# Patient Record
Sex: FEMALE | URBAN - METROPOLITAN AREA
[De-identification: names, ages, dates, MRNs, and addresses within clinical notes are randomized per-mention and may not be internally consistent; named-entity substitution may affect disease eponyms.]

---

## 2023-11-10 ENCOUNTER — LAB OUTSIDE AN ENCOUNTER (OUTPATIENT)
Dept: URBAN - METROPOLITAN AREA CLINIC 27 | Facility: CLINIC | Age: 23
End: 2023-11-10

## 2023-11-10 ENCOUNTER — DASHBOARD ENCOUNTERS (OUTPATIENT)
Age: 23
End: 2023-11-10

## 2023-11-10 ENCOUNTER — OFFICE VISIT (OUTPATIENT)
Dept: URBAN - METROPOLITAN AREA CLINIC 27 | Facility: CLINIC | Age: 23
End: 2023-11-10
Payer: COMMERCIAL

## 2023-11-10 VITALS
DIASTOLIC BLOOD PRESSURE: 79 MMHG | RESPIRATION RATE: 17 BRPM | HEART RATE: 57 BPM | HEIGHT: 69 IN | WEIGHT: 140 LBS | SYSTOLIC BLOOD PRESSURE: 121 MMHG | BODY MASS INDEX: 20.73 KG/M2

## 2023-11-10 DIAGNOSIS — R19.4 ALTERATION IN BOWEL ELIMINATION: ICD-10-CM

## 2023-11-10 DIAGNOSIS — L98.8 FISTULA: ICD-10-CM

## 2023-11-10 PROCEDURE — 99244 OFF/OP CNSLTJ NEW/EST MOD 40: CPT | Performed by: INTERNAL MEDICINE

## 2023-11-10 PROCEDURE — 99204 OFFICE O/P NEW MOD 45 MIN: CPT | Performed by: INTERNAL MEDICINE

## 2023-11-10 NOTE — HPI-TODAY'S VISIT:
This is a 23-year-old female seen as a new patient in consultation at the request of  for her alternating diarrhea and constipation and recent finding of a fistula.  She was evaluated Dr. Andrade and the fistula is healed.  She is referred for possible colonoscopy to rule out Crohn's disease.  She has had change in her GI symptoms only in the past 3 years since she graduated college.  She does feel she has some lactose intolerance with pizza but also tries to avoid gluten but does eat gluten.  She has tried a FODMAP diet and that seems to help some as well.  She has alternating diarrhea and constipation but predominantly diarrhea.  Her weight is stable.

## 2023-11-13 LAB
A/G RATIO: 1.8
ABSOLUTE BASOPHILS: 30
ABSOLUTE EOSINOPHILS: 70
ABSOLUTE LYMPHOCYTES: 2180
ABSOLUTE MONOCYTES: 490
ABSOLUTE NEUTROPHILS: 2230
ALBUMIN: 4.5
ALKALINE PHOSPHATASE: 51
ALT (SGPT): 22
AST (SGOT): 29
BASOPHILS: 0.6
BILIRUBIN, TOTAL: 0.5
BUN/CREATININE RATIO: (no result)
BUN: 14
C-REACTIVE PROTEIN, QUANT: 0.9
CALCIUM: 9.7
CARBON DIOXIDE, TOTAL: 25
CHLORIDE: 104
CREATININE: 0.69
EGFR: 124
EOSINOPHILS: 1.4
GLIADIN (DEAMIDATED): 2.1
GLIADIN (DEAMIDATED): 9.8
GLOBULIN, TOTAL: 2.5
GLUCOSE: 82
HEMATOCRIT: 41.3
HEMOGLOBIN: 14
IMMUNOGLOBULIN A, QN, SERUM: 134
LYMPHOCYTES: 43.6
MCH: 29.9
MCHC: 33.9
MCV: 88.1
MONOCYTES: 9.8
MPV: 11.5
NEUTROPHILS: 44.6
PLATELET COUNT: 215
POTASSIUM: 4.4
PROTEIN, TOTAL: 7
RDW: 12
RED BLOOD CELL COUNT: 4.69
SODIUM: 139
T-TRANSGLUTAMINASE (TTG) IGA: <1
TSH: 2.04
WHITE BLOOD CELL COUNT: 5

## 2024-01-02 ENCOUNTER — WEB ENCOUNTER (OUTPATIENT)
Dept: URBAN - METROPOLITAN AREA SURGERY CENTER 7 | Facility: SURGERY CENTER | Age: 24
End: 2024-01-02

## 2024-01-04 ENCOUNTER — OFFICE VISIT (OUTPATIENT)
Dept: URBAN - METROPOLITAN AREA SURGERY CENTER 7 | Facility: SURGERY CENTER | Age: 24
End: 2024-01-04
Payer: COMMERCIAL

## 2024-01-04 ENCOUNTER — CLAIMS CREATED FROM THE CLAIM WINDOW (OUTPATIENT)
Dept: URBAN - METROPOLITAN AREA CLINIC 4 | Facility: CLINIC | Age: 24
End: 2024-01-04
Payer: COMMERCIAL

## 2024-01-04 DIAGNOSIS — K52.9 INFLAMMATION OF INTESTINES: ICD-10-CM

## 2024-01-04 DIAGNOSIS — R19.7 ACUTE DIARRHEA: ICD-10-CM

## 2024-01-04 DIAGNOSIS — K63.89 OTHER SPECIFIED DISEASES OF INTESTINE: ICD-10-CM

## 2024-01-04 DIAGNOSIS — K63.89 APPENDICITIS EPIPLOICA: ICD-10-CM

## 2024-01-04 DIAGNOSIS — K60.2 PERIANAL FISSURE: ICD-10-CM

## 2024-01-04 DIAGNOSIS — R19.7 DIARRHEA: ICD-10-CM

## 2024-01-04 PROCEDURE — 88305 TISSUE EXAM BY PATHOLOGIST: CPT | Performed by: PATHOLOGY

## 2024-01-04 PROCEDURE — 45380 COLONOSCOPY AND BIOPSY: CPT | Performed by: INTERNAL MEDICINE

## 2024-01-04 PROCEDURE — G8907 PT DOC NO EVENTS ON DISCHARG: HCPCS | Performed by: INTERNAL MEDICINE

## 2024-01-04 PROCEDURE — 00811 ANES LWR INTST NDSC NOS: CPT | Performed by: NURSE ANESTHETIST, CERTIFIED REGISTERED

## 2024-02-14 ENCOUNTER — OV EP (OUTPATIENT)
Dept: URBAN - METROPOLITAN AREA CLINIC 27 | Facility: CLINIC | Age: 24
End: 2024-02-14